# Patient Record
Sex: MALE | Race: WHITE | Employment: OTHER | URBAN - METROPOLITAN AREA
[De-identification: names, ages, dates, MRNs, and addresses within clinical notes are randomized per-mention and may not be internally consistent; named-entity substitution may affect disease eponyms.]

---

## 2017-07-11 ENCOUNTER — HOSPITAL ENCOUNTER (OUTPATIENT)
Dept: SURGERY | Age: 72
Discharge: HOME | End: 2017-07-11
Attending: INTERNAL MEDICINE | Admitting: INTERNAL MEDICINE

## 2017-07-11 VITALS
DIASTOLIC BLOOD PRESSURE: 72 MMHG | RESPIRATION RATE: 16 BRPM | OXYGEN SATURATION: 97 % | TEMPERATURE: 97.3 F | HEART RATE: 58 BPM | HEIGHT: 71 IN | SYSTOLIC BLOOD PRESSURE: 109 MMHG | WEIGHT: 232 LBS | BODY MASS INDEX: 32.48 KG/M2

## 2017-07-11 RX ORDER — SODIUM CHLORIDE, SODIUM LACTATE, POTASSIUM CHLORIDE, CALCIUM CHLORIDE 600; 310; 30; 20 MG/100ML; MG/100ML; MG/100ML; MG/100ML
INJECTION, SOLUTION INTRAVENOUS CONTINUOUS
Status: DISCONTINUED | OUTPATIENT
Start: 2017-07-11 | End: 2017-07-12 | Stop reason: HOSPADM

## 2017-07-11 RX ADMIN — SODIUM CHLORIDE, SODIUM LACTATE, POTASSIUM CHLORIDE, CALCIUM CHLORIDE: 600; 310; 30; 20 INJECTION, SOLUTION INTRAVENOUS at 08:17

## 2017-07-11 ASSESSMENT — PAIN - FUNCTIONAL ASSESSMENT: PAIN_FUNCTIONAL_ASSESSMENT: 0-10

## 2019-07-24 ENCOUNTER — TELEPHONE (OUTPATIENT)
Dept: CARDIOLOGY CLINIC | Age: 74
End: 2019-07-24

## 2021-07-27 ENCOUNTER — TELEPHONE (OUTPATIENT)
Dept: CARDIOLOGY CLINIC | Age: 76
End: 2021-07-27

## 2021-07-28 ENCOUNTER — TELEPHONE (OUTPATIENT)
Dept: CARDIOLOGY CLINIC | Age: 76
End: 2021-07-28

## 2021-07-28 NOTE — TELEPHONE ENCOUNTER
Cardiologist: Dr. Kimberly Wiggins  Surgeon: Dr. Seth Rooney  Surgery: Fusion Prostate Bx   Anesthesia: MAC  Date: Pending  FAX# [phone]  Ph# [phone]    New pt appt.  8/10/2021  w/Dr Kimberly Wiggins    Last seen 5/2015

## 2021-08-10 ENCOUNTER — OFFICE VISIT (OUTPATIENT)
Dept: CARDIOLOGY CLINIC | Age: 76
End: 2021-08-10
Payer: COMMERCIAL

## 2021-08-10 VITALS
DIASTOLIC BLOOD PRESSURE: 66 MMHG | SYSTOLIC BLOOD PRESSURE: 138 MMHG | WEIGHT: 245.8 LBS | BODY MASS INDEX: 34.41 KG/M2 | HEIGHT: 71 IN | HEART RATE: 72 BPM

## 2021-08-10 DIAGNOSIS — Z01.818 PREOPERATIVE CLEARANCE: Primary | ICD-10-CM

## 2021-08-10 PROCEDURE — 93000 ELECTROCARDIOGRAM COMPLETE: CPT | Performed by: INTERNAL MEDICINE

## 2021-08-10 PROCEDURE — 99204 OFFICE O/P NEW MOD 45 MIN: CPT | Performed by: INTERNAL MEDICINE

## 2021-08-10 RX ORDER — ZINC GLUCONATE 50 MG
50 TABLET ORAL DAILY
COMMUNITY

## 2021-08-10 NOTE — PATIENT INSTRUCTIONS
drinks even those labeled  caffeine free or decaffeinated. ? Please wear loose comfortable clothing and comfortable walking shoes. Please wear a short sleeved shirt. ? Please shower or bathe and do not apply powder or lotion to the skin prior to testing, as the electrodes will adhere better giving us a clearer visual EKG recording. Please hold on to these instructions the  will call you within 1-9 business days when we receive authorization from your insurance. Echocardiogram    WHAT TO EXPECT:   ? This test will take approximately 45 minutes. ? It is an ultrasound of the heart. ? It can look at the valves and chambers inside the heart   ? There is no special instructions for this test.     If you are unable to keep this appointment, please notify us 24 hours prior to test at [phone]. Please hold on to these instructions the  will call you within 1-9 business days when we receive authorization from your insurance. Echocardiogram    WHAT TO EXPECT:   ? This test will take approximately 45 minutes. ? It is an ultrasound of the heart. ? It can look at the valves and chambers inside the heart   ? There is no special instructions for this test.     If you are unable to keep this appointment, please notify us 24 hours prior to test at [phone].

## 2021-08-10 NOTE — LETTER
Tolu Tracy Page  1945  [id number]    Have you had any Chest Pain that is not new? - Yes, rare, occasional  If Yes DO EKG - How does it feel - Tightness   How long does the pain last - 2 seconds    How long have you been having the pain - Years   Did you take a NONE   And did it relieve the pain - it goes away on it's own      Have you had any Shortness of Breath - No  If Yes - When     Have you had any dizziness - No     Have you had any palpitations that are not new? - No       Do you have any edema - swelling in No    If Yes - CHECK TO SEE IF THE EDEMA IS PITTING      When did you have your last labs drawn: between 1/2021-4/2021 Not in chart  Where did you have them done Dr. Vick Acharya  What doctor ordered Dr. Vick Acharya    If we do not have these labs you are retrieve these labs for these providers! Do you have a surgery or procedure scheduled in the near future - Yes  If Yes- DO EKG  If Yes - Who is the surgery with?  Dr. Jorge Luis Sorto  Phone number of physician   Fax number of physician   Type of surgery Prostate Biopsy, pending clearance    GIVE THIS INFORMATION TO CHELSEY CH

## 2021-08-10 NOTE — PROGRESS NOTES
CARDIOLOGY NOTE      8/10/2021    RE: Pankaj Villar  ([birth year])                               TO:  MD Timbo Elliottroxy Meade is a 77 y.o. male who was seen today for management of  preop                                    HPI:                   The patient does not have cardiac complaints, here for preop for prostate Bx  Patient also seen  for    - Hypertension,is  well controlled, pt is  compliant with medicines  - Hyperlipidimea, importance of hyperlipidimea discussed with pt. Pankaj Villar has the following history recorded in care path:  Patient Active Problem List    Diagnosis Date Noted    LBBB (left bundle branch block) 04/09/2013    HTN (hypertension) 04/09/2013    Hyperlipidemia     H/O cardiovascular stress test      Current Outpatient Medications   Medication Sig Dispense Refill    zinc gluconate 50 MG tablet Take 50 mg by mouth daily      Cholecalciferol (VITAMIN D) 2000 UNITS CAPS capsule Take 2,000 Units by mouth daily      lisinopril (PRINIVIL;ZESTRIL) 10 MG tablet Take 10 mg by mouth daily.  pravastatin (PRAVACHOL) 40 MG tablet Take 40 mg by mouth daily.  alfuzosin (UROXATRAL) 10 MG SR tablet Take 10 mg by mouth daily.  aspirin 81 MG tablet Take 81 mg by mouth daily. No current facility-administered medications for this visit. Allergies: Other  Past Medical History:   Diagnosis Date    H/O cardiovascular stress test 5/11/2010, 2004    5/11/2010 - No inducible myocardial ischemia. Diaphragmatic attenuation. EF = 48%. LVSF is mildly reduced. No ecg changes. Unremarkable pharmacological stress test.    H/O echocardiogram 5/11/2010 5/11/2010 - LVSF is normal. EF = > 55%.     H/O echocardiogram 04/12/13    EF-55% Normal    Hepatitis     Hyperlipidemia     Hypertension      Past Surgical History:   Procedure Laterality Date    ACHILLES TENDON SURGERY      x 2 s/p ruptures    APPENDECTOMY      BACK SURGERY      COLONOSCOPY      COLONOSCOPY  07/11/2017    2 small polyps    LAMINECTOMY  6/14    OTHER SURGICAL HISTORY      Sub Muscus resection    VASECTOMY        As reviewed   Family History   Problem Relation Age of Onset    Cancer Mother     Heart Attack Father     Diabetes Father     Other Son     Heart Attack Paternal Grandmother     Heart Attack Paternal Grandfather     Atrial Fibrillation Sister      Social History     Tobacco Use    Smoking status: Never Smoker    Smokeless tobacco: Former User   Substance Use Topics    Alcohol use: Yes     Comment: Moderate alcohol use. CAFFEINE: 1 large coffee daily      Review of Systems:    Constitutional: Negative for diaphoresis and fatigue  Psychological:Negative for anxiety or depression  HENT: Negative for headaches, nasal congestion, sinus pain or vertigo  Eyes: Negative for visual disturbance. Endocrine: Negative for polydipsia/polyuria  Respiratory: Negative for shortness of breath  Cardiovascular: Negative for chest pain, dyspnea on exertion, claudication, edema, irregular heartbeat, murmur, palpitations or shortness of breath  Gastrointestinal: Negative for abdominal pain or heartburn  Genito-Urinary: Negative for urinary frequency/urgency  Musculoskeletal: Negative for muscle pain, muscular weakness, negative for pain in arm and leg or swelling in foot and leg  Neurological: Negative for dizziness, headaches, memory loss, numbness/tingling, visual changes, syncope  Dermatological: Negative for rash    Objective:    Vitals:    08/10/21 1133   BP: 138/66   Site: Left Upper Arm   Position: Sitting   Cuff Size: Large Adult   Pulse: 72   Weight: 245 lb 12.8 oz (111.5 kg)   Height: 5' 11\" (1.803 m)     /66 (Site: Left Upper Arm, Position: Sitting, Cuff Size: Large Adult)   Pulse 72   Ht 5' 11\" (1.803 m)   Wt 245 lb 12.8 oz (111.5 kg)   BMI 34.28 kg/m²     No flowsheet data found.      Wt Readings from Last 3 Encounters:   08/10/21 245 lb 12.8 oz (111.5 kg)   07/11/17 232 lb (105.2 kg)   07/10/17 230 lb (104.3 kg)     Body mass index is 34.28 kg/m². GENERAL - Alert, oriented, pleasant, in no apparent distress. EYES: No jaundice, no conjunctival pallor. SKIN: It is warm & dry. No rashes. No Echhymosis    HEENT - No clinically significant abnormalities seen. Neck - Supple. No jugular venous distention noted. No carotid bruits. Cardiovascular - Normal S1 and S2 without obvious murmur or gallop. Extremities - No cyanosis, clubbing, or significant edema. Pulmonary - No respiratory distress. No wheezes or rales. Abdomen - No masses, tenderness, or organomegaly. Musculoskeletal - No significant edema. No joint deformities. No muscle wasting. Neurologic - Cranial nerves II through XII are grossly intact. There were no gross focal neurologic abnormalities. Lab Review   No results found for: CKTOTAL, CKMB, CKMBINDEX, TROPONINT  BNP:  No results found for: BNP  PT/INR:  No results found for: INR  No results found for: LABA1C  No results found for: WBC, HCT, MCV, PLT  No results found for: CHOL, TRIG, HDL, LDLCALC, LDLDIRECT, CHOLHDLRATIO  No results found for: ALT, AST  BMP:  No results found for: NA, K, CL, CO2, BUN, CREATININE  CMP: No results found for: NA, K, CL, CO2, BUN, PROT, ALB  TSH:  No results found for: TSH, TSHHS        Assessment & Plan:    - Preop Assessment: ETT and echo    -  Hypertension: Patients blood pressure is normal. Patient is advised about low sodium diet. Present medical regimen will not be changed. On prinivil         -  LIPID MANAGEMENT:  Importance of lipid levels discussed with patient   and patient was given dietary advice. NCEP- ATP III guidelines reviewed with patient.     -   Changes  in medicines made: No     On pravachol                                   Yudith Crespo MD    [facility] [place]

## 2021-08-13 ENCOUNTER — PROCEDURE VISIT (OUTPATIENT)
Dept: CARDIOLOGY CLINIC | Age: 76
End: 2021-08-13
Payer: COMMERCIAL

## 2021-08-13 DIAGNOSIS — Z01.818 PREOPERATIVE CLEARANCE: ICD-10-CM

## 2021-08-13 LAB
LV EF: 58 %
LVEF MODALITY: NORMAL

## 2021-08-13 PROCEDURE — 93015 CV STRESS TEST SUPVJ I&R: CPT | Performed by: INTERNAL MEDICINE

## 2021-08-13 PROCEDURE — 93306 TTE W/DOPPLER COMPLETE: CPT | Performed by: INTERNAL MEDICINE

## 2021-08-16 ENCOUNTER — TELEPHONE (OUTPATIENT)
Dept: CARDIOLOGY CLINIC | Age: 76
End: 2021-08-16

## 2021-08-16 NOTE — TELEPHONE ENCOUNTER
Summary   Limited study due to patients body habitus. Left ventricular function and size is normal, EF is estimated at 55-60%. Moderate left ventricular hypertrophy. Normal diastolic filling pattern for age. No regional wall motion abnormalities were detected. Bi atrial enlargement noted. Mild mitral and tricuspid regurgitation is present. RVSP is 24 mmHg. No evidence of pericardial effusion. Signature      ------------------------------------------------------------------   Electronically signed by Jorge WhitlockInterpreting physician) on 08/13/2021 at 12:56 PM   ------------------------------------------------------------------      Left msg on pt's v/m advising of results.

## 2021-08-17 ENCOUNTER — TELEPHONE (OUTPATIENT)
Dept: CARDIOLOGY CLINIC | Age: 76
End: 2021-08-17

## 2021-08-17 NOTE — TELEPHONE ENCOUNTER
Pt cld, said he got a bill for DOS 8/10/2021- it was an error as ins hasn't paid yet. I told him to disregard the bill.

## 2021-11-29 ENCOUNTER — OFFICE VISIT (OUTPATIENT)
Dept: CARDIOLOGY CLINIC | Age: 76
End: 2021-11-29
Payer: COMMERCIAL

## 2021-11-29 VITALS
HEIGHT: 71 IN | DIASTOLIC BLOOD PRESSURE: 74 MMHG | SYSTOLIC BLOOD PRESSURE: 128 MMHG | BODY MASS INDEX: 34.3 KG/M2 | HEART RATE: 78 BPM | WEIGHT: 245 LBS

## 2021-11-29 DIAGNOSIS — I10 PRIMARY HYPERTENSION: ICD-10-CM

## 2021-11-29 DIAGNOSIS — I51.7 MODERATE LEFT VENTRICULAR HYPERTROPHY: ICD-10-CM

## 2021-11-29 DIAGNOSIS — E78.2 MIXED HYPERLIPIDEMIA: Primary | ICD-10-CM

## 2021-11-29 DIAGNOSIS — I44.7 LBBB (LEFT BUNDLE BRANCH BLOCK): ICD-10-CM

## 2021-11-29 PROCEDURE — 99214 OFFICE O/P EST MOD 30 MIN: CPT | Performed by: NURSE PRACTITIONER

## 2021-11-29 ASSESSMENT — ENCOUNTER SYMPTOMS
SLEEP DISTURBANCES DUE TO BREATHING: 0
ORTHOPNEA: 0
BACK PAIN: 1
SHORTNESS OF BREATH: 0

## 2021-11-29 NOTE — PROGRESS NOTES
[facility] [address] [facility] [facility]  Phone: [phone]    Fax [phone]                  Krista Mason MD, Queenie Henriquez MD, [address], MD, MD Lida Gregg MD Lenell Mango, MD Sherita Heal, APRN               Nik North Jackson, APRN    Jeff West, APRN              Rafita Grad, APRN            11/29/2021    RE: Shalom Whalen  ([birth year])                               TO:  Dr. Edil Jarvis MD  The primary cardiologist is Dr. Lani Sun    CC:   1. Mixed hyperlipidemia    2. Primary hypertension    3. LBBB (left bundle branch block)         HPI:    Thank you for involving me in taking care of your patient Shalom Whalen. Shalom Whalen is a 77y.o. year old male with a history as listed above and is being seen in the office today. Shalom Whalen reports that he is feeling good. There is no chest pain. He denies SOB. There are no reported palpitations. He denies dizziness. He is  active. SP back fusion and cage. He states that he is now able to go back to doing exercise, not as fast or vigorous but improving       Current Outpatient Medications   Medication Sig Dispense Refill    zinc gluconate 50 MG tablet Take 50 mg by mouth daily      Cholecalciferol (VITAMIN D) 2000 UNITS CAPS capsule Take 2,000 Units by mouth daily      lisinopril (PRINIVIL;ZESTRIL) 10 MG tablet Take 10 mg by mouth daily.  pravastatin (PRAVACHOL) 40 MG tablet Take 40 mg by mouth daily.  alfuzosin (UROXATRAL) 10 MG SR tablet Take 10 mg by mouth daily.  aspirin 81 MG tablet Take 81 mg by mouth daily. No current facility-administered medications for this visit. Allergies: Other  Past Medical History:   Diagnosis Date    Echocardiogram 08/13/2021    EF 55-60%, Mod LVH, Bi Atrial enlargement noted, Mild MR & TR.    H/O cardiovascular stress test 5/11/2010, 2004    5/11/2010 - No inducible myocardial ischemia. Diaphragmatic attenuation. EF = 48%. LVSF is mildly reduced. No ecg changes. Unremarkable pharmacological stress test.    H/O echocardiogram 05/11/2010 5/11/2010 - LVSF is normal. EF = > 55%.  H/O echocardiogram 04/12/2013    EF-55% Normal    Hepatitis     Hyperlipidemia     Hypertension      Past Surgical History:   Procedure Laterality Date    ACHILLES TENDON SURGERY      x 2 s/p ruptures    APPENDECTOMY      BACK SURGERY      COLONOSCOPY      COLONOSCOPY  07/11/2017    2 small polyps    LAMINECTOMY  6/14    OTHER SURGICAL HISTORY      Sub Muscus resection    VASECTOMY       Family History   Problem Relation Age of Onset    Cancer Mother     Heart Attack Father     Diabetes Father     Other Son     Heart Attack Paternal Grandmother     Heart Attack Paternal Grandfather     Atrial Fibrillation Sister      Social History     Tobacco Use    Smoking status: Never Smoker    Smokeless tobacco: Former User   Substance Use Topics    Alcohol use: Yes     Comment: Moderate alcohol use. CAFFEINE: 1 large coffee daily        Review of Systems   Constitutional: Negative for malaise/fatigue. Eyes: Negative for visual disturbance. Cardiovascular: Negative for chest pain, claudication, cyanosis, dyspnea on exertion, irregular heartbeat, leg swelling, near-syncope, orthopnea, palpitations, paroxysmal nocturnal dyspnea and syncope. Respiratory: Negative for shortness of breath and sleep disturbances due to breathing. Musculoskeletal: Positive for back pain (resolving). Neurological: Negative for focal weakness, light-headedness and weakness. Psychiatric/Behavioral: Negative for depression. The patient is not nervous/anxious.              Objective:      Physical Exam:  /74   Pulse 78   Ht 5' 11\" (1.803 m)   Wt 245 lb (111.1 kg)   BMI 34.17 kg/m²   Wt Readings from Last 3 Encounters:   11/29/21 245 lb (111.1 kg)   08/10/21 245 lb 12.8 oz (111.5 kg)   07/11/17 232 lb (105.2 kg) Body mass index is 34.17 kg/m². Physical Exam  Vitals reviewed. Constitutional:       General: He is not in acute distress. Appearance: Normal appearance. He is obese. He is not ill-appearing. HENT:      Head: Atraumatic. Neck:      Vascular: No carotid bruit. Cardiovascular:      Rate and Rhythm: Normal rate and regular rhythm. Pulses: Normal pulses. Heart sounds: Normal heart sounds. No murmur heard. Pulmonary:      Effort: Pulmonary effort is normal. No respiratory distress. Breath sounds: Normal breath sounds. Musculoskeletal:         General: No swelling or deformity. Cervical back: Neck supple. No muscular tenderness. Neurological:      Mental Status: He is alert. DATA  BNP: No results found for: BNP, PROBNP  CBC:   No results found for: WBC, RBC, HGB, HCT, PLT  CMP:  No results found for: NA, K, CL, CO2, BUN, CREATININE, GFRAA, AGRATIO, LABGLOM, GLUCOSE, CALCIUM  Hepatic Function Panel:  No results found for: ALKPHOS, ALT, AST, PROT, BILITOT, BILIDIR, IBILI, LABALBU  Magnesium:  No results found for: MG  PT/INR:  No results found for: PROTIME, INR  Lipids:  No results found for: TRIG, HDL, LDLCALC, LDLDIRECT, LABVLDL  No results found for: LABA1C  TSH:  No results found for: TSH    Echo 8/13/2021   Summary   Limited study due to patients body habitus. Left ventricular function and size is normal, EF is estimated at 55-60%. Moderate left ventricular hypertrophy. Normal diastolic filling pattern for age. No regional wall motion abnormalities were detected. Bi atrial enlargement noted. Mild mitral and tricuspid regurgitation is present. RVSP is 24 mmHg. No evidence of pericardial effusion. GXT stress 8/13/2021   Summary   Overall Impression:   Normal stress test   Normal exercise performance without angina and ischemic EKG changes. Functional Capacity:   normal    Assessment/ Plan:    Patient seen, interviewed and examined. Testing was reviewed. 1. Mixed hyperlipidemia   Lipid panel drawn by PCP   He reports that his total cholesterol is 164, triglycerides 80's   Continue pravastatin   Will attempt to get records.  Discussed with the patient the need for exercise, low cholesterol diet, and compliance with medications. 2. Primary hypertension   Controlled   lispinopril     advised low salt diet     3. LBBB (left bundle branch block)  · Stress test does not show abnormality. · Continue to monitor    4. Moderate LVH  · Discussed exercise induced HTN. · Encouraged to increase cardiac exercise        Lifestyle and risk factor modificatons discussed. Various goals are discussed and questions answered. Continue current medications. Appropriate prescriptions are addressed. Questions answered and patient verbalizes understanding.       Office visit in 1 year

## 2021-11-29 NOTE — LETTER
Marques Oneill  1945  [id number]    Have you had any Chest Pain that is not new? - No  If Yes DO EKG - How does it feel -    How long does the pain last -      How long have you been having the pain -    Did you take a    And did it relieve the pain -      DO EKG IF: Patient has a Heart Rate above 100 or below 40     CAD (Coronary Artery Disease) patient should have one on file every 6 months        Have you had any Shortness of Breath - No  If Yes - When     Have you had any dizziness - No  If Yes DO ORTHOSTATIC BP - when do you feel dizzy    How long does it last .        Sitting wait 5 minutes do supine (laying down) wait 5 minutes then do standing - log each in \"vitals\" area in Epic   Be sure to ask what symptoms they are having if they get dizzy while completing ortho stats such as: room spinning, nausea, etc.    Have you had any palpitations that are not new? - No  If Yes DO EKG - Do you feel your heart   How long does it last - . Is the patient on any of the following medications -   If Yes DO EKG - Needs done every 6 months    Do you have any edema - swelling in No    If Yes - CHECK TO SEE IF THE EDEMA IS PITTING  How long have they been having edema -   If Yes - Have they worn compression stockings   If they have worn compression stockings      Vein \"LEG PROBLEM Questionnaire\"  1. Do you have prominent leg veins? 2. Do you have any skin discoloration? No  3. Do you have any healed or active sores? No  4. Do you have swelling of the legs? No  5. Do you have a family history of varicose veins? No  6. Does your profession involve pro-longed        standing or heavy lifting? No  7. Have you been fighting overweight problems? No  8. Do you have restless legs? No  9. Do you have any night time cramps? No  10.  Do you have any of the following in your legs:             When did you have your last labs drawn , states less than a year ago, goes in [place] for new labs. Where did you have them done   What doctor ordered     If we do not have these labs you are retrieve these labs for these providers! Do you have a surgery or procedure scheduled in the near future - No  If Yes- DO EKG  If Yes - Who is the surgery with?    Phone number of physician   Fax number of physician   Type of surgery   GIVE THIS INFORMATION TO CHELSEY CH     Ask patient if they want to sign up for [employer] if they are not already signed up     Check to see if we have an E-MAIL on file for the patient     Check medication list thoroughly!!! AND RECONCILE OUTSIDE MEDICATIONS  If dose has changed change the entire order not just the [place] At check out add to every patient's \"wrap up\" the following dot phrase AFTERHOURSEDUCATION and ensure we explain this to our patients

## 2022-12-01 ENCOUNTER — OFFICE VISIT (OUTPATIENT)
Dept: CARDIOLOGY CLINIC | Age: 77
End: 2022-12-01
Payer: COMMERCIAL

## 2022-12-01 VITALS
WEIGHT: 240.4 LBS | SYSTOLIC BLOOD PRESSURE: 124 MMHG | HEIGHT: 71 IN | HEART RATE: 59 BPM | DIASTOLIC BLOOD PRESSURE: 78 MMHG | BODY MASS INDEX: 33.65 KG/M2

## 2022-12-01 DIAGNOSIS — E78.2 MIXED HYPERLIPIDEMIA: ICD-10-CM

## 2022-12-01 DIAGNOSIS — I44.7 LBBB (LEFT BUNDLE BRANCH BLOCK): ICD-10-CM

## 2022-12-01 DIAGNOSIS — I10 PRIMARY HYPERTENSION: Primary | ICD-10-CM

## 2022-12-01 PROCEDURE — 3078F DIAST BP <80 MM HG: CPT | Performed by: NURSE PRACTITIONER

## 2022-12-01 PROCEDURE — 99214 OFFICE O/P EST MOD 30 MIN: CPT | Performed by: NURSE PRACTITIONER

## 2022-12-01 PROCEDURE — 1123F ACP DISCUSS/DSCN MKR DOCD: CPT | Performed by: NURSE PRACTITIONER

## 2022-12-01 PROCEDURE — 3074F SYST BP LT 130 MM HG: CPT | Performed by: NURSE PRACTITIONER

## 2022-12-01 PROCEDURE — 93000 ELECTROCARDIOGRAM COMPLETE: CPT | Performed by: NURSE PRACTITIONER

## 2022-12-01 RX ORDER — MULTIVIT WITH MINERALS/LUTEIN
500 TABLET ORAL DAILY
COMMUNITY

## 2022-12-01 ASSESSMENT — ENCOUNTER SYMPTOMS
SHORTNESS OF BREATH: 0
ORTHOPNEA: 0

## 2022-12-01 NOTE — PROGRESS NOTES
12/1/2022  Primary cardiologist: Dr. Sharlett Holter:   Natividad Robert  is an established 69 y.o.  male here for a 12 month follow up on hyperlipidemia and hypertension       SUBJECTIVE/OBJECTIVE:  Natividad Robert is a 69 y.o. male with a history of hypertension, hyperlipidemia, and Left bundle branch block      HPI :   Natividad Robert reports he is feeling well. He denies chest pain or shortness of breath. He is active and compliant with medications. He is monitoring diet and working on weight loss. Review of Systems   Constitutional: Negative for diaphoresis and malaise/fatigue. Cardiovascular:  Negative for chest pain, claudication, dyspnea on exertion, irregular heartbeat, leg swelling, near-syncope, orthopnea, palpitations and paroxysmal nocturnal dyspnea. Respiratory:  Negative for shortness of breath. Neurological:  Negative for dizziness and light-headedness. Vitals:    12/01/22 0819   BP: 124/78   Site: Right Upper Arm   Position: Sitting   Cuff Size: Large Adult   Pulse: 59   Weight: 240 lb 6.4 oz (109 kg)   Height: 5' 11\" (1.803 m)     No flowsheet data found. Wt Readings from Last 3 Encounters:   12/01/22 240 lb 6.4 oz (109 kg)   11/29/21 245 lb (111.1 kg)   08/10/21 245 lb 12.8 oz (111.5 kg)     Body mass index is 33.53 kg/m². Physical Exam  HENT:      Head: Normocephalic and atraumatic. Mouth/Throat:      Mouth: Mucous membranes are moist.   Eyes:      Extraocular Movements: Extraocular movements intact. Pupils: Pupils are equal, round, and reactive to light. Neck:      Vascular: No carotid bruit. Cardiovascular:      Rate and Rhythm: Normal rate and regular rhythm. Pulses: Normal pulses. Heart sounds: Normal heart sounds. Pulmonary:      Effort: Pulmonary effort is normal.      Breath sounds: Normal breath sounds. Abdominal:      General: There is no distension. Palpations: Abdomen is soft. Tenderness: There is no abdominal tenderness.    Musculoskeletal:         General: Normal range of motion. Cervical back: No tenderness. Right lower leg: No edema. Left lower leg: No edema. Skin:     General: Skin is warm and dry. Capillary Refill: Capillary refill takes less than 2 seconds. Neurological:      Mental Status: He is alert and oriented to person, place, and time. Psychiatric:         Mood and Affect: Mood normal.         Behavior: Behavior normal.              Current Outpatient Medications   Medication Sig Dispense Refill    Ascorbic Acid (VITAMIN C) 250 MG tablet Take 500 mg by mouth daily      zinc gluconate 50 MG tablet Take 50 mg by mouth daily      Cholecalciferol (VITAMIN D) 2000 UNITS CAPS capsule Take 2,000 Units by mouth daily      lisinopril (PRINIVIL;ZESTRIL) 10 MG tablet Take 10 mg by mouth daily. pravastatin (PRAVACHOL) 40 MG tablet Take 40 mg by mouth daily Pt takes 1/2 pill every other day      alfuzosin (UROXATRAL) 10 MG extended release tablet Take 10 mg by mouth daily. aspirin 81 MG tablet Take 81 mg by mouth daily. No current facility-administered medications for this visit. All pertinent data reviewed and discussed with patient       ASSESSMENT/PLAN:      Dyslipidemia   No recent lipids- has had labs with PCP  Request copy  He is on Pravachol. No reported adverse events or reported side effects from medication(s) and is stable on current dose. encouraged healthy eating habits including low fat -low /cholesterol diet      Hypertension   Blood pressure is Controlled  He is on lisinopril. No reported adverse events or reported side effects from medication(s). He is stable on current dose.   Encouraged a low sodium diet    Obesity   Bmi 33.53  Working on weight loss     EKG : SR RBBB  No change when compared to previous   Copy to patient       Tests ordered:  none  Follow-up  as needed      Signed:  PENNY Lawson CNP, 12/1/2022, 8:37 AM    An electronic signature was used to authenticate this note.    Please note this report has been partially produced using speech recognition software and may contain errors related to that system including errors in grammar, punctuation, and spelling, as well as words and phrases that may be inappropriate. If there are any questions or concerns please feel free to contact the dictating provider for clarification.

## 2025-07-22 ENCOUNTER — TELEPHONE (OUTPATIENT)
Dept: CARDIOLOGY CLINIC | Age: 80
End: 2025-07-22

## 2025-07-22 NOTE — TELEPHONE ENCOUNTER
[facility]         Patient called to establish cardiac care. Patient was scheduled and given instruction to bring a copy of their state ID, a copy of their insurance cards along with any previous cardiac records. Patient was also asked to bring their current medication bottles to their appointment.      Patient or referring doctor office heard about us through:previous patient.     Patient had an EKG in May from PCP

## 2025-07-24 ENCOUNTER — TELEPHONE (OUTPATIENT)
Dept: CARDIOLOGY CLINIC | Age: 80
End: 2025-07-24

## 2025-07-24 ENCOUNTER — OFFICE VISIT (OUTPATIENT)
Dept: CARDIOLOGY CLINIC | Age: 80
End: 2025-07-24
Payer: COMMERCIAL

## 2025-07-24 VITALS
HEIGHT: 71 IN | SYSTOLIC BLOOD PRESSURE: 128 MMHG | HEART RATE: 86 BPM | DIASTOLIC BLOOD PRESSURE: 70 MMHG | BODY MASS INDEX: 33.32 KG/M2 | WEIGHT: 238 LBS

## 2025-07-24 DIAGNOSIS — R94.31 ABNORMAL ELECTROCARDIOGRAPHY: ICD-10-CM

## 2025-07-24 DIAGNOSIS — R42 DIZZINESS: ICD-10-CM

## 2025-07-24 DIAGNOSIS — R55 SYNCOPE, UNSPECIFIED SYNCOPE TYPE: Primary | ICD-10-CM

## 2025-07-24 PROCEDURE — 93000 ELECTROCARDIOGRAM COMPLETE: CPT | Performed by: INTERNAL MEDICINE

## 2025-07-24 PROCEDURE — 99204 OFFICE O/P NEW MOD 45 MIN: CPT | Performed by: INTERNAL MEDICINE

## 2025-07-24 PROCEDURE — 3078F DIAST BP <80 MM HG: CPT | Performed by: INTERNAL MEDICINE

## 2025-07-24 PROCEDURE — 1123F ACP DISCUSS/DSCN MKR DOCD: CPT | Performed by: INTERNAL MEDICINE

## 2025-07-24 PROCEDURE — 3074F SYST BP LT 130 MM HG: CPT | Performed by: INTERNAL MEDICINE

## 2025-07-24 NOTE — PROGRESS NOTES
CARDIOLOGY NOTE      7/24/2025    RE: Martín Soler  (1945)                               TO:  Yogesh Palomino MD            CHIEF COMPLAINT   Martín is a 80 y.o. male who was seen today for management of                  Patient was seen by me couple of years ago for preop assessment has history of hypertension hyperlipidemia however lately has been feeling dizzy and has been episodes of passing out he is not orthostatic here and does not feel any palpitation does not feel the symptoms when he is exerting.       Martín Soler has the following history recorded in care path:  Patient Active Problem List    Diagnosis Date Noted    Moderate left ventricular hypertrophy 11/29/2021    LBBB (left bundle branch block) 04/09/2013    HTN (hypertension) 04/09/2013    Hyperlipidemia     H/O cardiovascular stress test      Current Outpatient Medications   Medication Sig Dispense Refill    Cholecalciferol (VITAMIN D) 2000 UNITS CAPS capsule Take 1 capsule by mouth daily      pravastatin (PRAVACHOL) 40 MG tablet Take 40 mg by mouth daily Pt takes 1/2 pill every other day      alfuzosin (UROXATRAL) 10 MG extended release tablet Take 1 tablet by mouth daily      aspirin 81 MG tablet Take 1 tablet by mouth daily      Ascorbic Acid (VITAMIN C) 250 MG tablet Take 500 mg by mouth daily (Patient not taking: Reported on 7/24/2025)      zinc gluconate 50 MG tablet Take 50 mg by mouth daily (Patient not taking: Reported on 7/24/2025)      lisinopril (PRINIVIL;ZESTRIL) 10 MG tablet Take 10 mg by mouth daily.       No current facility-administered medications for this visit.     Allergies: Other  Past Medical History:   Diagnosis Date    Echocardiogram 08/13/2021    EF 55-60%, Mod LVH, Bi Atrial enlargement noted, Mild MR & TR.    H/O cardiovascular stress test 5/11/2010, 2004 5/11/2010 - No inducible myocardial ischemia. Diaphragmatic attenuation. EF = 48%. LVSF is mildly reduced. No ecg changes. Unremarkable

## 2025-07-24 NOTE — PATIENT INSTRUCTIONS
Thank you for allowing us to care for you today!   We want to ensure we can follow your treatment plan and we strive to give you the best outcomes and experience possible.   If you ever have a life threatening emergency and call 911 - for an ambulance (EMS)  REMEMBER  Our providers can only care for you at:   [facility] or [facility]   Even if you have someone take you or you drive yourself we can only care for you in a [facility] facility. Our providers are not setup at the other healthcare locations!    PLEASE CALL OUR OFFICE DURING NORMAL BUSINESS HOURS  Monday through Friday 8 am to 5 pm  AFTER HOURS the physician on-call cannot help with scheduling, rescheduling, procedure instruction questions or any type of medication need or issue.  [facility] P:[phone] - [facility] P:[phone] - [facility] P:[phone]      If you receive a survey:  We would appreciate you taking the time to share your experience concerning your provider visit in the office.    These surveys are confidential!  We are eager to improve and are counting on you to share your feedback so we can ensure you get the best care possible.

## 2025-07-24 NOTE — PROGRESS NOTES
CLINICAL STAFF DOCUMENTATION    Dr. Chay Soler  1945  [id number]    Have you had any Chest Pain recently? - No          Have you had any Shortness of Breath - No      Have you had any dizziness - Yes  If Yes DO ORTHOSTATIC BP including pulse  When do you feel dizzy? Randomly   Does the room spin? Foggy   How long does it last .  seconds     Have patient lie down for 5 minutes then measure blood pressure and pulse rate.   Have the patient stand.   Repeat blood pressure and pulse rate after patient has been standing for 1 minute   Repeat blood pressure and pulse rate after patient has been standing for 3 minutes.   Be sure to ask what symptoms they are having if they get dizzy while completing ortho stats such as: room spinning, nausea, etc.    Have you had any palpitations recently? - No      Do you have any edema - swelling in No        Is the patient on any of the following medications -   If Yes DO EKG - Needs done every 3 months      If we do not have these labs, you are retrieve these labs for the provider!    Do you need any prescriptions refilled? -     Do you have a surgery or procedure scheduled in the near future - No    Do use tobacco products? - No  Do you drink alcohol? - Yes  Do you use any illicit drugs? - No  Caffeine? - Yes  How much caffeine? .2  cups       Check medication list thoroughly!!! AND RECONCILE OUTSIDE MEDICATIONS  If dose has changed change the entire order not just the MG  BE SURE TO ASK PATIENT IF THEY NEED MEDICATION REFILLS  Verify Pharmacy and update if incorrect    Add to every patient's \"wrap up\" the following dot phrase AFTERVISITCARDIOHEARTHOUSE and ensure we explain this to our patients

## 2025-07-26 ENCOUNTER — CLINICAL DOCUMENTATION (OUTPATIENT)
Dept: CARDIOLOGY CLINIC | Age: 80
End: 2025-07-26

## 2025-07-27 ENCOUNTER — HOSPITAL ENCOUNTER (INPATIENT)
Age: 80
LOS: 2 days | Discharge: HOME | DRG: 243 | End: 2025-07-29
Attending: EMERGENCY MEDICINE | Admitting: INTERNAL MEDICINE
Payer: MEDICARE

## 2025-07-27 ENCOUNTER — APPOINTMENT (OUTPATIENT)
Dept: GENERAL RADIOLOGY | Age: 80
DRG: 243 | End: 2025-07-27
Payer: MEDICARE

## 2025-07-27 DIAGNOSIS — R55 SYNCOPE AND COLLAPSE: ICD-10-CM

## 2025-07-27 DIAGNOSIS — R55 SYNCOPE: ICD-10-CM

## 2025-07-27 DIAGNOSIS — I45.5 SINUS PAUSE: Primary | ICD-10-CM

## 2025-07-27 DIAGNOSIS — R06.02 SHORTNESS OF BREATH: ICD-10-CM

## 2025-07-27 LAB
ALBUMIN SERPL-MCNC: 4.2 G/DL (ref 3.4–5)
ALBUMIN/GLOB SERPL: 1.5 {RATIO} (ref 1.1–2.2)
ALP SERPL-CCNC: 79 U/L (ref 40–129)
ALT SERPL-CCNC: 15 U/L (ref 10–40)
ANION GAP SERPL CALCULATED.3IONS-SCNC: 10 MMOL/L (ref 9–17)
AST SERPL-CCNC: 20 U/L (ref 15–37)
BASOPHILS # BLD: 0.05 K/UL
BASOPHILS NFR BLD: 1 % (ref 0–1)
BILIRUB SERPL-MCNC: 0.9 MG/DL (ref 0–1)
BUN SERPL-MCNC: 14 MG/DL (ref 7–20)
CALCIUM SERPL-MCNC: 9.4 MG/DL (ref 8.3–10.6)
CHLORIDE SERPL-SCNC: 102 MMOL/L (ref 99–110)
CO2 SERPL-SCNC: 28 MMOL/L (ref 21–32)
CREAT SERPL-MCNC: 1.1 MG/DL (ref 0.8–1.3)
EKG ATRIAL RATE: 71 BPM
EKG DIAGNOSIS: NORMAL
EKG P AXIS: 22 DEGREES
EKG P-R INTERVAL: 204 MS
EKG Q-T INTERVAL: 436 MS
EKG QRS DURATION: 170 MS
EKG QTC CALCULATION (BAZETT): 473 MS
EKG R AXIS: -64 DEGREES
EKG T AXIS: 73 DEGREES
EKG VENTRICULAR RATE: 71 BPM
EOSINOPHIL # BLD: 0.16 K/UL
EOSINOPHILS RELATIVE PERCENT: 2 % (ref 0–3)
ERYTHROCYTE [DISTWIDTH] IN BLOOD BY AUTOMATED COUNT: 13 % (ref 11.7–14.9)
GFR, ESTIMATED: 67 ML/MIN/1.73M2
GLUCOSE SERPL-MCNC: 162 MG/DL (ref 74–99)
HCT VFR BLD AUTO: 45.8 % (ref 42–52)
HGB BLD-MCNC: 16 G/DL (ref 13.5–18)
IMM GRANULOCYTES # BLD AUTO: 0.02 K/UL
IMM GRANULOCYTES NFR BLD: 0 %
LYMPHOCYTES NFR BLD: 1.56 K/UL
LYMPHOCYTES RELATIVE PERCENT: 21 % (ref 24–44)
MAGNESIUM SERPL-MCNC: 2.1 MG/DL (ref 1.8–2.4)
MCH RBC QN AUTO: 33.1 PG (ref 27–31)
MCHC RBC AUTO-ENTMCNC: 34.9 G/DL (ref 32–36)
MCV RBC AUTO: 94.6 FL (ref 78–100)
MONOCYTES NFR BLD: 0.46 K/UL
MONOCYTES NFR BLD: 6 % (ref 0–5)
NEUTROPHILS NFR BLD: 70 % (ref 36–66)
NEUTS SEG NFR BLD: 5.16 K/UL
PLATELET # BLD AUTO: 152 K/UL (ref 140–440)
PMV BLD AUTO: 10.5 FL (ref 7.5–11.1)
POTASSIUM SERPL-SCNC: 4.6 MMOL/L (ref 3.5–5.1)
PROT SERPL-MCNC: 7.1 G/DL (ref 6.4–8.2)
RBC # BLD AUTO: 4.84 M/UL (ref 4.6–6.2)
SODIUM SERPL-SCNC: 140 MMOL/L (ref 136–145)
TROPONIN I SERPL HS-MCNC: 13 NG/L (ref 0–22)
TROPONIN I SERPL HS-MCNC: 14 NG/L (ref 0–22)
WBC OTHER # BLD: 7.4 K/UL (ref 4–10.5)

## 2025-07-27 PROCEDURE — 99285 EMERGENCY DEPT VISIT HI MDM: CPT

## 2025-07-27 PROCEDURE — 94761 N-INVAS EAR/PLS OXIMETRY MLT: CPT

## 2025-07-27 PROCEDURE — 85025 COMPLETE CBC W/AUTO DIFF WBC: CPT

## 2025-07-27 PROCEDURE — 71045 X-RAY EXAM CHEST 1 VIEW: CPT

## 2025-07-27 PROCEDURE — 93005 ELECTROCARDIOGRAM TRACING: CPT | Performed by: EMERGENCY MEDICINE

## 2025-07-27 PROCEDURE — 84484 ASSAY OF TROPONIN QUANT: CPT

## 2025-07-27 PROCEDURE — 93010 ELECTROCARDIOGRAM REPORT: CPT | Performed by: INTERNAL MEDICINE

## 2025-07-27 PROCEDURE — 99223 1ST HOSP IP/OBS HIGH 75: CPT | Performed by: INTERNAL MEDICINE

## 2025-07-27 PROCEDURE — 6370000000 HC RX 637 (ALT 250 FOR IP): Performed by: EMERGENCY MEDICINE

## 2025-07-27 PROCEDURE — 83735 ASSAY OF MAGNESIUM: CPT

## 2025-07-27 PROCEDURE — 80053 COMPREHEN METABOLIC PANEL: CPT

## 2025-07-27 PROCEDURE — 2060000000 HC ICU INTERMEDIATE R&B

## 2025-07-27 PROCEDURE — 2500000003 HC RX 250 WO HCPCS: Performed by: INTERNAL MEDICINE

## 2025-07-27 RX ORDER — ACETAMINOPHEN 650 MG/1
650 SUPPOSITORY RECTAL EVERY 6 HOURS PRN
Status: DISCONTINUED | OUTPATIENT
Start: 2025-07-27 | End: 2025-07-29 | Stop reason: HOSPADM

## 2025-07-27 RX ORDER — POTASSIUM CHLORIDE 7.45 MG/ML
10 INJECTION INTRAVENOUS PRN
Status: DISCONTINUED | OUTPATIENT
Start: 2025-07-27 | End: 2025-07-29 | Stop reason: HOSPADM

## 2025-07-27 RX ORDER — SODIUM CHLORIDE 9 MG/ML
INJECTION, SOLUTION INTRAVENOUS PRN
Status: DISCONTINUED | OUTPATIENT
Start: 2025-07-27 | End: 2025-07-28

## 2025-07-27 RX ORDER — ACETAMINOPHEN 325 MG/1
650 TABLET ORAL EVERY 6 HOURS PRN
Status: DISCONTINUED | OUTPATIENT
Start: 2025-07-27 | End: 2025-07-29 | Stop reason: HOSPADM

## 2025-07-27 RX ORDER — ASPIRIN 81 MG/1
81 TABLET, CHEWABLE ORAL DAILY
Status: DISCONTINUED | OUTPATIENT
Start: 2025-07-28 | End: 2025-07-29 | Stop reason: HOSPADM

## 2025-07-27 RX ORDER — VITAMIN B COMPLEX
2000 TABLET ORAL DAILY
Status: DISCONTINUED | OUTPATIENT
Start: 2025-07-28 | End: 2025-07-29 | Stop reason: HOSPADM

## 2025-07-27 RX ORDER — SODIUM CHLORIDE 0.9 % (FLUSH) 0.9 %
5-40 SYRINGE (ML) INJECTION EVERY 12 HOURS SCHEDULED
Status: DISCONTINUED | OUTPATIENT
Start: 2025-07-27 | End: 2025-07-29 | Stop reason: HOSPADM

## 2025-07-27 RX ORDER — ONDANSETRON 4 MG/1
4 TABLET, ORALLY DISINTEGRATING ORAL EVERY 8 HOURS PRN
Status: DISCONTINUED | OUTPATIENT
Start: 2025-07-27 | End: 2025-07-29 | Stop reason: HOSPADM

## 2025-07-27 RX ORDER — MAGNESIUM SULFATE IN WATER 40 MG/ML
2000 INJECTION, SOLUTION INTRAVENOUS PRN
Status: DISCONTINUED | OUTPATIENT
Start: 2025-07-27 | End: 2025-07-29 | Stop reason: HOSPADM

## 2025-07-27 RX ORDER — LISINOPRIL 5 MG/1
10 TABLET ORAL DAILY
Status: DISCONTINUED | OUTPATIENT
Start: 2025-07-28 | End: 2025-07-29 | Stop reason: HOSPADM

## 2025-07-27 RX ORDER — SODIUM CHLORIDE 0.9 % (FLUSH) 0.9 %
5-40 SYRINGE (ML) INJECTION PRN
Status: DISCONTINUED | OUTPATIENT
Start: 2025-07-27 | End: 2025-07-29 | Stop reason: HOSPADM

## 2025-07-27 RX ORDER — ASPIRIN 81 MG/1
324 TABLET, CHEWABLE ORAL ONCE
Status: COMPLETED | OUTPATIENT
Start: 2025-07-27 | End: 2025-07-27

## 2025-07-27 RX ORDER — POLYETHYLENE GLYCOL 3350 17 G/17G
17 POWDER, FOR SOLUTION ORAL DAILY PRN
Status: DISCONTINUED | OUTPATIENT
Start: 2025-07-27 | End: 2025-07-29 | Stop reason: HOSPADM

## 2025-07-27 RX ORDER — ENOXAPARIN SODIUM 100 MG/ML
40 INJECTION SUBCUTANEOUS DAILY
Status: DISCONTINUED | OUTPATIENT
Start: 2025-07-28 | End: 2025-07-27 | Stop reason: DRUGHIGH

## 2025-07-27 RX ORDER — ONDANSETRON 2 MG/ML
4 INJECTION INTRAMUSCULAR; INTRAVENOUS EVERY 6 HOURS PRN
Status: DISCONTINUED | OUTPATIENT
Start: 2025-07-27 | End: 2025-07-29 | Stop reason: HOSPADM

## 2025-07-27 RX ORDER — ENOXAPARIN SODIUM 100 MG/ML
30 INJECTION SUBCUTANEOUS 2 TIMES DAILY
Status: DISCONTINUED | OUTPATIENT
Start: 2025-07-28 | End: 2025-07-29 | Stop reason: HOSPADM

## 2025-07-27 RX ORDER — POTASSIUM CHLORIDE 1500 MG/1
40 TABLET, EXTENDED RELEASE ORAL PRN
Status: DISCONTINUED | OUTPATIENT
Start: 2025-07-27 | End: 2025-07-29 | Stop reason: HOSPADM

## 2025-07-27 RX ADMIN — ASPIRIN 81 MG 324 MG: 81 TABLET ORAL at 11:24

## 2025-07-27 RX ADMIN — SODIUM CHLORIDE, PRESERVATIVE FREE 10 ML: 5 INJECTION INTRAVENOUS at 21:44

## 2025-07-27 ASSESSMENT — LIFESTYLE VARIABLES
HOW MANY STANDARD DRINKS CONTAINING ALCOHOL DO YOU HAVE ON A TYPICAL DAY: PATIENT DOES NOT DRINK
HOW OFTEN DO YOU HAVE A DRINK CONTAINING ALCOHOL: NEVER

## 2025-07-27 ASSESSMENT — PAIN SCALES - GENERAL: PAINLEVEL_OUTOF10: 0

## 2025-07-27 ASSESSMENT — PAIN - FUNCTIONAL ASSESSMENT: PAIN_FUNCTIONAL_ASSESSMENT: 0-10

## 2025-07-27 NOTE — ED NOTES
The following labs were labeled with appropriate pt sticker and tubed to lab:     [x] Blue     [x] Lavender   [] on ice  [x] Green/yellow x2  [] Green/black [] on ice  [x] Grey  [] on ice  [] Yellow  [x] Red  [] Pink  [] Type/ Screen  [] ABG  [] VBG    [] COVID-19 swab    [] Rapid  [] PCR  [] Flu swab  [] Peds Viral Panel     [] Urine Sample  [] Fecal Sample  [] Pelvic Cultures  [] Blood Cultures  [] X 2  [] STREP Cultures  [] Wound Cultures

## 2025-07-27 NOTE — CONSULTS
Name:  Martín Soler /Age/Sex: 1945  (80 y.o. male)   MRN & CSN:  [id number] & [id number] Admission Date/Time: 2025 10:31 AM   Location:  ED29/ED-29 PCP: Yogesh Alas MD       Hospital Day: 1          Referring physician:  Ginger Kiran MD         Reason for consultation: Syncope            Thanks for referral.    Information source: Patient    CC; syncope      HPI:   Thank you for involving me in taking  care of Martín Soler who  is a 80 y.o.year  Old male  Presents with history of hypertension, hyperlipidemia now presents with having episodes of syncope  Had an event monitor had a 6-second pause I was called for that last evening  Was advised to come to the emergency room for pacemaker implantation evaluation                   Past medical history:    has a past medical history of Echocardiogram, H/O cardiovascular stress test, H/O echocardiogram, H/O echocardiogram, Hepatitis, Hyperlipidemia, and Hypertension.  Past surgical history:   has a past surgical history that includes Appendectomy; Achilles tendon surgery; other surgical history; laminectomy (); Vasectomy; back surgery; Colonoscopy; and Colonoscopy (2017).  Social History:   reports that he has never smoked. He has quit using smokeless tobacco. He reports current alcohol use. He reports that he does not use drugs.  Family history:  family history includes Atrial Fibrillation in his sister; Cancer in his mother; Diabetes in his father; Heart Attack in his father, paternal grandfather, and paternal grandmother; Other in his son.    Allergies   Allergen Reactions    Other      Macadamia nuts cause hives.       No current facility-administered medications for this encounter.    No current facility-administered medications for this encounter.     Current Outpatient Medications   Medication Sig Dispense Refill    Ascorbic Acid (VITAMIN C) 250 MG tablet Take 500 mg by mouth daily (Patient not taking: Reported on

## 2025-07-27 NOTE — H&P
V2.0  History and Physical      Name:  Martín Soler /Age/Sex: 1945  (80 y.o. male)   MRN & CSN:  [id number] & [id number] Encounter Date/Time: 2025 1:02 PM EDT   Location:  ED29/ED-29 PCP: Yogesh Alas MD       Hospital Day: 1    Assessment and Plan:   Martín Soler is a 80 y.o. male with a pmh of HTN, Vitamin D deficiency, who presents with Sinus pause    Hospital Problems           Last Modified POA    * (Principal) Sinus pause 2025 Yes       Sinus pause  Hx of multiple syncopal episode over the past year  -presenting complaint- sinus pause 6 second; sent from cardiology clinic for pacemaker placement  -admit on telemetry  -NPO from midnight  -plan for PPM tomorrow    Essential HTN, continue lisinopril  Vitamin D deficiency, continue supplement    Disposition:     Diet No diet orders on file   DVT Prophylaxis [] Lovenox, []  Heparin, [] SCDs, [] Ambulation,  [] Eliquis, [] Xarelto  [] Coumadin   Peptic ulcer prophylaxis -   Code Status No Order   Disposition From / Current living situation : Home  Expected Disposition: Home  Estimated Date of Discharge: 2 to 3 days   Patient requires continued admission due to sinus pauses requiring PPM   Surrogate Decision Maker/ POA -     Goals status was discussed in detail with patient.  Verbalized understanding of different options of CODE STATUS.  Patient opted for full code.    History from:     patient    History of Present Illness:     Chief Complaint: Sinus pause  Martín Soler is a 80 y.o. male with pmh of hypertension, vitamin D deficiency, syncopal episodes, who was seen by Dr. Marsh's team in the clinic-was found to have sinus pause lasting 6 seconds. Cardiac monitor was placed due to history of multiple syncopal episode over the past year.  Patient was sent in to be admitted for permanent pacemaker placement tomorrow.  Patient will be admitted to stepdown on telemetry.  N.p.o. after midnight.  Cardiology team is on board.        Personally

## 2025-07-27 NOTE — ED NOTES
ED TO INPATIENT SBAR HANDOFF    Patient Name: Martín Soler   :  1945  80 y.o.   Preferred Name  Martín  Family/Caregiver Present yes   Restraints no   C-SSRS: Risk of Suicide: No Risk  Sitter no   Sepsis Risk Score Sepsis V2 Risk Score: 14.5    PLEASE NOTE--Encounter / Re-Admission Within 30 Days  This patient has had another encounter or admission within the last 30 days.      No data recorded    Situation  Chief Complaint   Patient presents with    reports Maximo sent for admission for pacemaker placement     Irregular Heart Beat     Brief Description of Patient's Condition: Irregular Heart Beat  reports Maximo sent for admission for pacemaker placement  Mental Status: oriented, alert, coherent, logical, thought processes intact, and able to concentrate and follow conversation  Arrived from: home    Imaging:   XR CHEST PORTABLE   Final Result        Abnormal labs:   Abnormal Labs Reviewed   COMPREHENSIVE METABOLIC PANEL - Abnormal; Notable for the following components:       Result Value    Glucose 162 (*)     All other components within normal limits   CBC WITH AUTO DIFFERENTIAL - Abnormal; Notable for the following components:    MCH 33.1 (*)     Neutrophils % 70 (*)     Lymphocytes % 21 (*)     Monocytes % 6 (*)     All other components within normal limits        Background  History:   Past Medical History:   Diagnosis Date    Echocardiogram 2021    EF 55-60%, Mod LVH, Bi Atrial enlargement noted, Mild MR & TR.    H/O cardiovascular stress test 2010, 2004    2010 - No inducible myocardial ischemia. Diaphragmatic attenuation. EF = 48%. LVSF is mildly reduced. No ecg changes. Unremarkable pharmacological stress test.    H/O echocardiogram 2010 - LVSF is normal. EF = > 55%.    H/O echocardiogram 2013    EF-55% Normal    Hepatitis     Hyperlipidemia     Hypertension        Assessment    Vitals:    Level of Consciousness: Alert (0)   Vitals:    25 1045 25

## 2025-07-27 NOTE — CONSULTS
LOVENOX PROPHYLAXIS EVALUATION  (Populations not addressed in this protocol: trauma, obstetrics, or COVID-19)    Wt Readings from Last 3 Encounters:   07/24/25 108 kg (238 lb)   12/01/22 109 kg (240 lb 6.4 oz)   11/29/21 111.1 kg (245 lb)       Estimated Creatinine Clearance: 68 mL/min (based on SCr of 1.1 mg/dL).  Recent Labs     07/27/25  1110   BUN 14   CREATININE 1.1      HGB 16.0   HCT 45.8       Weight Range: 101-150.9 kg    CRCL = 30 or greater    []  50.9 kg   and below     []  .9  kg   []  101-150.9 kg   []  151-174.9  kg   []  175 kg  or greater     30mg subq  daily     40mg subq daily    (or 30mg subq BID orthopedic)     30mg subq  BID   40mg subq  BID   60mg subq BID       Per P/T protocol for appropriate subq anticoagulation by weight and CRCL change to:    Enoxaparin 30mg subq BID      Fawn Cohen RPH  1:50 PM  07/27/25

## 2025-07-27 NOTE — ED PROVIDER NOTES
Emergency Department Encounter  Location: [facility] ICU STEPDOWN    Patient: Martín Soler  MRN: [id number]  : 1945  Date of evaluation: 2025  ED Provider: Kellee Sheikh DO    Chief Complaint:    reports Maximo sent for admission for pacemaker placement  and Irregular Heart Beat    [place]:  Martín Soler is a 80 y.o. male that presents to the emergency department with concern for abnormal Holter monitor.  The patient saw Dr. Marsh Thursday after experiencing a syncopal episode on Monday.  He has a history of multiple episodes of syncope over the past year or so.  Unclear trigger but states he can \"feel it coming\" and describes feeling \"cloudy\" before it occurs.  Patient was placed on monitor per cardiology and was found to be having sinus pauses.  Was contacted by Dr. Marsh and instructed to come here last night.  Patient states he decided to wait until this morning.  Also notes he has not been on BP meds for several medications because he was normotensive in the office and meds were suspected to be contributing to syncopal episodes.       Past Medical History:   Diagnosis Date    Echocardiogram 2021    EF 55-60%, Mod LVH, Bi Atrial enlargement noted, Mild MR & TR.    H/O cardiovascular stress test 2010, 2004    2010 - No inducible myocardial ischemia. Diaphragmatic attenuation. EF = 48%. LVSF is mildly reduced. No ecg changes. Unremarkable pharmacological stress test.    H/O echocardiogram 2010 - LVSF is normal. EF = > 55%.    H/O echocardiogram 2013    EF-55% Normal    Hepatitis     Hyperlipidemia     Hypertension      Past Surgical History:   Procedure Laterality Date    ACHILLES TENDON SURGERY      x 2 s/p ruptures    APPENDECTOMY      BACK SURGERY      COLONOSCOPY      COLONOSCOPY  2017    2 small polyps    LAMINECTOMY      OTHER SURGICAL HISTORY      Sub Muscus resection    VASECTOMY       Family History   Problem Relation Age of Onset    Cancer Mother

## 2025-07-28 ENCOUNTER — APPOINTMENT (OUTPATIENT)
Dept: NON INVASIVE DIAGNOSTICS | Age: 80
DRG: 243 | End: 2025-07-28
Attending: INTERNAL MEDICINE
Payer: MEDICARE

## 2025-07-28 ENCOUNTER — TELEPHONE (OUTPATIENT)
Dept: CARDIOLOGY CLINIC | Age: 80
End: 2025-07-28

## 2025-07-28 ENCOUNTER — APPOINTMENT (OUTPATIENT)
Dept: GENERAL RADIOLOGY | Age: 80
DRG: 243 | End: 2025-07-28
Payer: MEDICARE

## 2025-07-28 PROBLEM — I49.5 SICK SINUS SYNDROME (HCC): Status: ACTIVE | Noted: 2025-07-27

## 2025-07-28 LAB
ALBUMIN SERPL-MCNC: 3.8 G/DL (ref 3.4–5)
ALBUMIN/GLOB SERPL: 1.5 {RATIO} (ref 1.1–2.2)
ALP SERPL-CCNC: 74 U/L (ref 40–129)
ALT SERPL-CCNC: 13 U/L (ref 10–40)
ANION GAP SERPL CALCULATED.3IONS-SCNC: 8 MMOL/L (ref 9–17)
AST SERPL-CCNC: 16 U/L (ref 15–37)
BASOPHILS # BLD: 0.04 K/UL
BASOPHILS NFR BLD: 1 % (ref 0–1)
BILIRUB DIRECT SERPL-MCNC: 0.3 MG/DL (ref 0–0.3)
BILIRUB INDIRECT SERPL-MCNC: 0.5 MG/DL (ref 0–0.7)
BILIRUB SERPL-MCNC: 0.8 MG/DL (ref 0–1)
BUN SERPL-MCNC: 21 MG/DL (ref 7–20)
CALCIUM SERPL-MCNC: 8.9 MG/DL (ref 8.3–10.6)
CHLORIDE SERPL-SCNC: 106 MMOL/L (ref 99–110)
CO2 SERPL-SCNC: 26 MMOL/L (ref 21–32)
CREAT SERPL-MCNC: 0.9 MG/DL (ref 0.8–1.3)
ECHO AO ROOT DIAM: 3.6 CM
ECHO AO ROOT INDEX: 1.6 CM/M2
ECHO AV AREA PEAK VELOCITY: 2.5 CM2
ECHO AV AREA VTI: 2.3 CM2
ECHO AV AREA/BSA PEAK VELOCITY: 1.1 CM2/M2
ECHO AV AREA/BSA VTI: 1 CM2/M2
ECHO AV MEAN GRADIENT: 7 MMHG
ECHO AV MEAN VELOCITY: 1.2 M/S
ECHO AV PEAK GRADIENT: 14 MMHG
ECHO AV PEAK VELOCITY: 1.9 M/S
ECHO AV VELOCITY RATIO: 0.79
ECHO AV VTI: 33.6 CM
ECHO BSA: 2.3 M2
ECHO BSA: 2.3 M2
ECHO EST RA PRESSURE: 3 MMHG
ECHO IVC PROX: 1.7 CM
ECHO LV E' LATERAL VELOCITY: 14 CM/S
ECHO LV E' SEPTAL VELOCITY: 7.7 CM/S
ECHO LV EDV A4C: 153 ML
ECHO LV EDV INDEX A4C: 68 ML/M2
ECHO LV EF PHYSICIAN: 60 %
ECHO LV EJECTION FRACTION A4C: 58 %
ECHO LV ESV A4C: 64 ML
ECHO LV ESV INDEX A4C: 28 ML/M2
ECHO LV FRACTIONAL SHORTENING: 25 % (ref 28–44)
ECHO LV INTERNAL DIMENSION DIASTOLE INDEX: 2.53 CM/M2
ECHO LV INTERNAL DIMENSION DIASTOLIC: 5.7 CM (ref 4.2–5.9)
ECHO LV INTERNAL DIMENSION SYSTOLIC INDEX: 1.91 CM/M2
ECHO LV INTERNAL DIMENSION SYSTOLIC: 4.3 CM
ECHO LV IVSD: 0.9 CM (ref 0.6–1)
ECHO LV MASS 2D: 211.7 G (ref 88–224)
ECHO LV MASS INDEX 2D: 94.1 G/M2 (ref 49–115)
ECHO LV POSTERIOR WALL DIASTOLIC: 1 CM (ref 0.6–1)
ECHO LV RELATIVE WALL THICKNESS RATIO: 0.35
ECHO LVOT AREA: 3.1 CM2
ECHO LVOT AV VTI INDEX: 0.72
ECHO LVOT DIAM: 2 CM
ECHO LVOT MEAN GRADIENT: 4 MMHG
ECHO LVOT PEAK GRADIENT: 9 MMHG
ECHO LVOT PEAK VELOCITY: 1.5 M/S
ECHO LVOT STROKE VOLUME INDEX: 33.9 ML/M2
ECHO LVOT SV: 76.3 ML
ECHO LVOT VTI: 24.3 CM
ECHO MV A VELOCITY: 0.94 M/S
ECHO MV E DECELERATION TIME (DT): 243 MS
ECHO MV E VELOCITY: 0.73 M/S
ECHO MV E/A RATIO: 0.78
ECHO MV E/E' LATERAL: 5.21
ECHO MV E/E' RATIO (AVERAGED): 7.35
ECHO MV E/E' SEPTAL: 9.48
ECHO RV FREE WALL PEAK S': 12.6 CM/S
ECHO RV TAPSE: 1.4 CM (ref 1.7–?)
EOSINOPHIL # BLD: 0.2 K/UL
EOSINOPHILS RELATIVE PERCENT: 3 % (ref 0–3)
ERYTHROCYTE [DISTWIDTH] IN BLOOD BY AUTOMATED COUNT: 13.1 % (ref 11.7–14.9)
GFR, ESTIMATED: 79 ML/MIN/1.73M2
GLUCOSE SERPL-MCNC: 144 MG/DL (ref 74–99)
HCT VFR BLD AUTO: 45.1 % (ref 42–52)
HGB BLD-MCNC: 15.2 G/DL (ref 13.5–18)
IMM GRANULOCYTES # BLD AUTO: 0.02 K/UL
IMM GRANULOCYTES NFR BLD: 0 %
LYMPHOCYTES NFR BLD: 1.44 K/UL
LYMPHOCYTES RELATIVE PERCENT: 22 % (ref 24–44)
MAGNESIUM SERPL-MCNC: 2.2 MG/DL (ref 1.8–2.4)
MCH RBC QN AUTO: 32.2 PG (ref 27–31)
MCHC RBC AUTO-ENTMCNC: 33.7 G/DL (ref 32–36)
MCV RBC AUTO: 95.6 FL (ref 78–100)
MONOCYTES NFR BLD: 0.56 K/UL
MONOCYTES NFR BLD: 9 % (ref 0–5)
NEUTROPHILS NFR BLD: 66 % (ref 36–66)
NEUTS SEG NFR BLD: 4.29 K/UL
PHOSPHATE SERPL-MCNC: 2.9 MG/DL (ref 2.5–4.9)
PLATELET, FLUORESCENCE: 143 K/UL (ref 140–440)
PMV BLD AUTO: 10.7 FL (ref 7.5–11.1)
POTASSIUM SERPL-SCNC: 4.1 MMOL/L (ref 3.5–5.1)
PROT SERPL-MCNC: 6.4 G/DL (ref 6.4–8.2)
RBC # BLD AUTO: 4.72 M/UL (ref 4.6–6.2)
SODIUM SERPL-SCNC: 140 MMOL/L (ref 136–145)
WBC OTHER # BLD: 6.6 K/UL (ref 4–10.5)

## 2025-07-28 PROCEDURE — 33208 INSRT HEART PM ATRIAL & VENT: CPT | Performed by: INTERNAL MEDICINE

## 2025-07-28 PROCEDURE — 94761 N-INVAS EAR/PLS OXIMETRY MLT: CPT

## 2025-07-28 PROCEDURE — 6360000002 HC RX W HCPCS

## 2025-07-28 PROCEDURE — 93306 TTE W/DOPPLER COMPLETE: CPT

## 2025-07-28 PROCEDURE — 0JH606Z INSERTION OF PACEMAKER, DUAL CHAMBER INTO CHEST SUBCUTANEOUS TISSUE AND FASCIA, OPEN APPROACH: ICD-10-PCS | Performed by: INTERNAL MEDICINE

## 2025-07-28 PROCEDURE — 84100 ASSAY OF PHOSPHORUS: CPT

## 2025-07-28 PROCEDURE — 99232 SBSQ HOSP IP/OBS MODERATE 35: CPT | Performed by: INTERNAL MEDICINE

## 2025-07-28 PROCEDURE — 2500000003 HC RX 250 WO HCPCS

## 2025-07-28 PROCEDURE — 02H63JZ INSERTION OF PACEMAKER LEAD INTO RIGHT ATRIUM, PERCUTANEOUS APPROACH: ICD-10-PCS | Performed by: INTERNAL MEDICINE

## 2025-07-28 PROCEDURE — 6360000004 HC RX CONTRAST MEDICATION

## 2025-07-28 PROCEDURE — 2500000003 HC RX 250 WO HCPCS: Performed by: INTERNAL MEDICINE

## 2025-07-28 PROCEDURE — 36415 COLL VENOUS BLD VENIPUNCTURE: CPT

## 2025-07-28 PROCEDURE — 6360000004 HC RX CONTRAST MEDICATION: Performed by: INTERNAL MEDICINE

## 2025-07-28 PROCEDURE — 02HK3JZ INSERTION OF PACEMAKER LEAD INTO RIGHT VENTRICLE, PERCUTANEOUS APPROACH: ICD-10-PCS | Performed by: INTERNAL MEDICINE

## 2025-07-28 PROCEDURE — C1785 PMKR, DUAL, RATE-RESP: HCPCS | Performed by: INTERNAL MEDICINE

## 2025-07-28 PROCEDURE — 2709999900 HC NON-CHARGEABLE SUPPLY: Performed by: INTERNAL MEDICINE

## 2025-07-28 PROCEDURE — C1892 INTRO/SHEATH,FIXED,PEEL-AWAY: HCPCS | Performed by: INTERNAL MEDICINE

## 2025-07-28 PROCEDURE — 83735 ASSAY OF MAGNESIUM: CPT

## 2025-07-28 PROCEDURE — 71045 X-RAY EXAM CHEST 1 VIEW: CPT

## 2025-07-28 PROCEDURE — 82248 BILIRUBIN DIRECT: CPT

## 2025-07-28 PROCEDURE — 6360000002 HC RX W HCPCS: Performed by: INTERNAL MEDICINE

## 2025-07-28 PROCEDURE — C1898 LEAD, PMKR, OTHER THAN TRANS: HCPCS | Performed by: INTERNAL MEDICINE

## 2025-07-28 PROCEDURE — 93306 TTE W/DOPPLER COMPLETE: CPT | Performed by: INTERNAL MEDICINE

## 2025-07-28 PROCEDURE — 2060000000 HC ICU INTERMEDIATE R&B

## 2025-07-28 PROCEDURE — 85025 COMPLETE CBC W/AUTO DIFF WBC: CPT

## 2025-07-28 PROCEDURE — 80053 COMPREHEN METABOLIC PANEL: CPT

## 2025-07-28 PROCEDURE — 99223 1ST HOSP IP/OBS HIGH 75: CPT | Performed by: INTERNAL MEDICINE

## 2025-07-28 DEVICE — IPG W1DR01 AZURE XT DR MRI USA
Type: IMPLANTABLE DEVICE | Site: HEART | Status: FUNCTIONAL
Brand: AZURE™ XT DR MRI SURESCAN™

## 2025-07-28 DEVICE — LEAD 5076-58 MRI US RCMCRD
Type: IMPLANTABLE DEVICE | Site: HEART | Status: FUNCTIONAL
Brand: CAPSUREFIX NOVUS MRI™ SURESCAN®

## 2025-07-28 DEVICE — LEAD 5076-52 RCMCRD MRI US
Type: IMPLANTABLE DEVICE | Site: HEART | Status: FUNCTIONAL
Brand: CAPSUREFIX NOVUS MRI™ SURESCAN®

## 2025-07-28 RX ORDER — SODIUM CHLORIDE 0.9 % (FLUSH) 0.9 %
5-40 SYRINGE (ML) INJECTION PRN
Status: DISCONTINUED | OUTPATIENT
Start: 2025-07-28 | End: 2025-07-29 | Stop reason: HOSPADM

## 2025-07-28 RX ORDER — FENTANYL CITRATE 50 UG/ML
INJECTION, SOLUTION INTRAMUSCULAR; INTRAVENOUS PRN
Status: DISCONTINUED | OUTPATIENT
Start: 2025-07-28 | End: 2025-07-28 | Stop reason: HOSPADM

## 2025-07-28 RX ORDER — SODIUM CHLORIDE 9 MG/ML
INJECTION, SOLUTION INTRAVENOUS PRN
Status: DISCONTINUED | OUTPATIENT
Start: 2025-07-28 | End: 2025-07-29 | Stop reason: HOSPADM

## 2025-07-28 RX ORDER — SODIUM CHLORIDE 0.9 % (FLUSH) 0.9 %
5-40 SYRINGE (ML) INJECTION EVERY 12 HOURS SCHEDULED
Status: DISCONTINUED | OUTPATIENT
Start: 2025-07-28 | End: 2025-07-29 | Stop reason: HOSPADM

## 2025-07-28 RX ORDER — IOPAMIDOL 755 MG/ML
INJECTION, SOLUTION INTRAVASCULAR PRN
Status: DISCONTINUED | OUTPATIENT
Start: 2025-07-28 | End: 2025-07-28 | Stop reason: HOSPADM

## 2025-07-28 RX ORDER — TRAMADOL HYDROCHLORIDE 50 MG/1
50 TABLET ORAL EVERY 6 HOURS PRN
Status: DISCONTINUED | OUTPATIENT
Start: 2025-07-28 | End: 2025-07-29 | Stop reason: HOSPADM

## 2025-07-28 RX ORDER — MIDAZOLAM HYDROCHLORIDE 1 MG/ML
INJECTION, SOLUTION INTRAMUSCULAR; INTRAVENOUS PRN
Status: DISCONTINUED | OUTPATIENT
Start: 2025-07-28 | End: 2025-07-28 | Stop reason: HOSPADM

## 2025-07-28 RX ADMIN — WATER 2000 MG: 1 INJECTION INTRAMUSCULAR; INTRAVENOUS; SUBCUTANEOUS at 17:40

## 2025-07-28 RX ADMIN — SODIUM CHLORIDE, PRESERVATIVE FREE 10 ML: 5 INJECTION INTRAVENOUS at 22:40

## 2025-07-28 RX ADMIN — SODIUM CHLORIDE, PRESERVATIVE FREE 10 ML: 5 INJECTION INTRAVENOUS at 22:41

## 2025-07-28 ASSESSMENT — ENCOUNTER SYMPTOMS
BLOOD IN STOOL: 0
SHORTNESS OF BREATH: 0
ABDOMINAL PAIN: 0
TROUBLE SWALLOWING: 0
CHEST TIGHTNESS: 0
NAUSEA: 0

## 2025-07-28 ASSESSMENT — PAIN SCALES - GENERAL
PAINLEVEL_OUTOF10: 0
PAINLEVEL_OUTOF10: 0

## 2025-07-28 NOTE — TELEPHONE ENCOUNTER
Received Critical Event Alert from [employer] over weekend. [place] contacted Dr. Marsh, pt was sent to hospital. Reports have been scanned into chart.

## 2025-07-28 NOTE — CONSULTS
Electrophysiology Consult Note      Reason for consultation:  significant pause, syncope    Chief complaint :  Syncope    Referring physician:       Primary care physician: Yogesh Alas MD      History of Present Illness:       Patient is a 80-year-old male with with a history of hypertension, hyperlipidemia, syncopal episodes presented to the hospital with syncopal episode.  Patient had 6-second pauses on the monitor he was wearing.  He had had several episodes of syncope in the last 1 year.  Patient was called in for an permanent pacemaker placement.  Patient denies chest pain or shortness of breath he reports he passes out and immediately wakes up and he gets no warnings.  Patient denies palpitations.  Patient agreeable with proceeding with pacemaker      Pastmedical history:   Past Medical History:   Diagnosis Date    Echocardiogram 08/13/2021    EF 55-60%, Mod LVH, Bi Atrial enlargement noted, Mild MR & TR.    H/O cardiovascular stress test 5/11/2010, 2004    5/11/2010 - No inducible myocardial ischemia. Diaphragmatic attenuation. EF = 48%. LVSF is mildly reduced. No ecg changes. Unremarkable pharmacological stress test.    H/O echocardiogram 05/11/2010 5/11/2010 - LVSF is normal. EF = > 55%.    H/O echocardiogram 04/12/2013    EF-55% Normal    Hepatitis     Hyperlipidemia     Hypertension        Surgical history :   Past Surgical History:   Procedure Laterality Date    ACHILLES TENDON SURGERY      x 2 s/p ruptures    APPENDECTOMY      BACK SURGERY      COLONOSCOPY      COLONOSCOPY  07/11/2017    2 small polyps    LAMINECTOMY  6/14    OTHER SURGICAL HISTORY      Sub Muscus resection    VASECTOMY         Family history:   Family History   Problem Relation Age of Onset    Cancer Mother     Heart Attack Father     Diabetes Father     Other Son     Heart Attack Paternal Grandmother     Heart Attack Paternal Grandfather     Atrial Fibrillation Sister

## 2025-07-28 NOTE — PROGRESS NOTES
CARDIOLOGY  NOTE        Name:  Martín Soler /Age/Sex: 1945  (80 y.o. male)   MRN & CSN:  [id number] & [id number] Admission Date/Time: 2025 10:31 AM   Location:  -A PCP: Yogesh Alas MD       Hospital Day: 2        PLAN FROM CARDIOLOGY FOR TODAY:   Pacemaker placed today      - cardiology consult is for: 6-second pause    -  Interval history: No more pauses    ASSESSMENT/ PLAN:      Patient was seen in the office for syncope, had a event monitor which showed 6-second pause, now is here per our advice for possible pacemaker implantation  EP consultation for pacer implantation     Hypertension blood pressure stable patient is on Prinivil which will be continued     Hyperlipidemia on Pravachol will check the LDL     Will have an echocardiogram done tomorrow morning for further assessment     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  Chief Complaint   Patient presents with    reports Maximo sent for admission for pacemaker placement     Irregular Heart Beat           Objective: Temperature:  Current - Temp: 97.9 °F (36.6 °C); Max - Temp  Av.3 °F (36.8 °C)  Min: 97.6 °F (36.4 °C)  Max: 98.9 °F (37.2 °C)    Respiratory Rate : Resp  Av.3  Min: 10  Max: 20    Pulse Range: Pulse  Av.9  Min: 56  Max: 72    Blood Presuure Range:  Systolic (24hrs), Av , Min:116 , Max:160   ; Diastolic (24hrs), Av, Min:68, Max:85      Pulse ox Range: SpO2  Av.3 %  Min: 95 %  Max: 97 %    24hr I & O:    Intake/Output Summary (Last 24 hours) at 2025 8425  Last data filed at

## 2025-07-28 NOTE — PROGRESS NOTES
V2.0    [facility] Progress Note      Name:  Martín Soler /Age/Sex: 1945  (80 y.o. male)   MRN & CSN:  [id number] & [id number] Encounter Date/Time: 2025 7:04 AM EDT   Location:  -A PCP: Yogesh Alas MD     Attending:Arely Saleh*       Hospital Day: 2    Assessment and Recommendations   Martín Soler is a 80 y.o. male with pmh of recurrent syncopal episodes, HTN, and Vitamin D deficiency  who presents with Sinus pause      Plan:   Sinus pause  Hx of multiple syncopal episode over the past year  - Presenting complaint was a sinus pause 6 second; he was sent from cardiology clinic for pacemaker placement  - Cardio on board, EP consulted  - Planned for possible placement of pacemaker, NPO since MN  - Follow up echo and possible stress test     Essential HTN  - Continue lisinopril, adjust based on trends    Vitamin D deficiency  - Continue supplement      Diet Diet NPO   DVT Prophylaxis [x] Lovenox, []  Heparin, [] SCDs, [] Ambulation,  [] Eliquis, [] Xarelto  [] Coumadin   Code Status Full Code   Disposition From: home  Expected Disposition: home  Estimated Date of Discharge: 1-2 days  Patient requires continued admission due to pending cardiac and EP eval   Surrogate Decision Maker/ POA  Spouse     Personally reviewed Lab Studies and Imaging     Discussed management of the case with the IDR team and cardiology     Drugs that require monitoring for toxicity include lovenox and lisinopril and the method of monitoring was cbc and bmp        Subjective:     Chief Complaint: recurrent syncopal episodes and noted sinus pause on monitor    Martín Soler is a 80 y.o. male who is seen and examined at bedside. No acute complaints, pleasant and interactive. Spouse at bedside. Discussed POC and they are amenable.      Review of Systems:      Pertinent positives and negatives discussed in HPI    Objective:   No intake or output data in the 24 hours ending 25 0704   Vitals:   Vitals:

## 2025-07-28 NOTE — PLAN OF CARE
Problem: Discharge Planning  Goal: Discharge to home or other facility with appropriate resources  7/28/2025 0845 by Katerina Ramirez, RN  Outcome: Progressing     Problem: Pain  Goal: Verbalizes/displays adequate comfort level or baseline comfort level  7/28/2025 0845 by Katerina Ramirez, RN  Outcome: Progressing

## 2025-07-28 NOTE — PROGRESS NOTES
4 Eyes Skin Assessment     NAME:  Martín Soler  YOB: 1945  MEDICAL RECORD NUMBER:  [id number]    The patient is being assessed for  Admission    I agree that at least one RN has performed a thorough Head to Toe Skin Assessment on the patient. ALL assessment sites listed below have been assessed.      Areas assessed by both nurses:    Head, Face, Ears, Shoulders, Back, Chest, Arms, Elbows, Hands, Sacrum. Buttock, Coccyx, Ischium, Legs. Feet and Heels, and Under Medical Devices         Does the Patient have a Wound? No noted wound(s)       Mihai Prevention initiated by RN: No  Wound Care Orders initiated by RN: No    For hospital-acquired stage 1 & 2 and ALL Stage 3,4, Unstageable, DTI, NWPT, and Complex wounds: place order “IP Wound Care/Ostomy Nurse Eval and Treat” by RN under : NA    New Ostomies, if present place, Ostomy referral order under : No     Nurse 1 eSignature: Electronically signed by Eric Quiroz RN on 7/28/25 at 7:10 AM EDT    **SHARE this note so that the co-signing nurse can place an eSignature**    Nurse 2 eSignature: {Esignature:[id number]}

## 2025-07-28 NOTE — PROGRESS NOTES
Patient has met with Dr. Hunter and discussed procedure to include implantation of dual-chamber pacemaker.  Dr. Hunter discussed risks with patient.  Patient elects to proceed with implantation of dual-chamber pacemaker.  Consent signed and on chart.

## 2025-07-29 ENCOUNTER — TELEPHONE (OUTPATIENT)
Dept: CARDIOLOGY CLINIC | Age: 80
End: 2025-07-29

## 2025-07-29 VITALS
WEIGHT: 233 LBS | BODY MASS INDEX: 32.62 KG/M2 | DIASTOLIC BLOOD PRESSURE: 75 MMHG | SYSTOLIC BLOOD PRESSURE: 139 MMHG | OXYGEN SATURATION: 97 % | RESPIRATION RATE: 14 BRPM | HEIGHT: 71 IN | TEMPERATURE: 97.7 F | HEART RATE: 60 BPM

## 2025-07-29 LAB
ANION GAP SERPL CALCULATED.3IONS-SCNC: 12 MMOL/L (ref 9–17)
BUN SERPL-MCNC: 24 MG/DL (ref 7–20)
CALCIUM SERPL-MCNC: 8.7 MG/DL (ref 8.3–10.6)
CHLORIDE SERPL-SCNC: 104 MMOL/L (ref 99–110)
CO2 SERPL-SCNC: 21 MMOL/L (ref 21–32)
CREAT SERPL-MCNC: 1.1 MG/DL (ref 0.8–1.3)
ERYTHROCYTE [DISTWIDTH] IN BLOOD BY AUTOMATED COUNT: 13.4 % (ref 11.7–14.9)
GFR, ESTIMATED: 68 ML/MIN/1.73M2
GLUCOSE SERPL-MCNC: 140 MG/DL (ref 74–99)
HCT VFR BLD AUTO: 45.3 % (ref 42–52)
HGB BLD-MCNC: 15.2 G/DL (ref 13.5–18)
MAGNESIUM SERPL-MCNC: 2.1 MG/DL (ref 1.8–2.4)
MCH RBC QN AUTO: 32.5 PG (ref 27–31)
MCHC RBC AUTO-ENTMCNC: 33.6 G/DL (ref 32–36)
MCV RBC AUTO: 96.8 FL (ref 78–100)
PHOSPHATE SERPL-MCNC: 2.8 MG/DL (ref 2.5–4.9)
PLATELET # BLD AUTO: 147 K/UL (ref 140–440)
PMV BLD AUTO: 10.6 FL (ref 7.5–11.1)
POTASSIUM SERPL-SCNC: 4.1 MMOL/L (ref 3.5–5.1)
RBC # BLD AUTO: 4.68 M/UL (ref 4.6–6.2)
SODIUM SERPL-SCNC: 137 MMOL/L (ref 136–145)
WBC OTHER # BLD: 7.4 K/UL (ref 4–10.5)

## 2025-07-29 PROCEDURE — 6370000000 HC RX 637 (ALT 250 FOR IP): Performed by: INTERNAL MEDICINE

## 2025-07-29 PROCEDURE — 36415 COLL VENOUS BLD VENIPUNCTURE: CPT

## 2025-07-29 PROCEDURE — 2500000003 HC RX 250 WO HCPCS

## 2025-07-29 PROCEDURE — 84100 ASSAY OF PHOSPHORUS: CPT

## 2025-07-29 PROCEDURE — 83735 ASSAY OF MAGNESIUM: CPT

## 2025-07-29 PROCEDURE — 85027 COMPLETE CBC AUTOMATED: CPT

## 2025-07-29 PROCEDURE — 6360000002 HC RX W HCPCS

## 2025-07-29 PROCEDURE — 94761 N-INVAS EAR/PLS OXIMETRY MLT: CPT

## 2025-07-29 PROCEDURE — 80048 BASIC METABOLIC PNL TOTAL CA: CPT

## 2025-07-29 RX ADMIN — Medication 2000 UNITS: at 08:19

## 2025-07-29 RX ADMIN — ASPIRIN 81 MG: 81 TABLET, CHEWABLE ORAL at 08:19

## 2025-07-29 RX ADMIN — WATER 2000 MG: 1 INJECTION INTRAMUSCULAR; INTRAVENOUS; SUBCUTANEOUS at 02:40

## 2025-07-29 RX ADMIN — LISINOPRIL 10 MG: 5 TABLET ORAL at 08:19

## 2025-07-29 ASSESSMENT — PAIN SCALES - GENERAL
PAINLEVEL_OUTOF10: 0
PAINLEVEL_OUTOF10: 0

## 2025-07-29 NOTE — PLAN OF CARE
Problem: Discharge Planning  Goal: Discharge to home or other facility with appropriate resources  7/29/2025 0759 by Katerina Ramirez, RN  Outcome: Progressing     Problem: Pain  Goal: Verbalizes/displays adequate comfort level or baseline comfort level  7/29/2025 0759 by Katerina Ramirez, RN  Outcome: Progressing     Problem: Cardiovascular - Adult  Goal: Maintains optimal cardiac output and hemodynamic stability  7/29/2025 0759 by Katerina Ramirez, RN  Outcome: Progressing

## 2025-07-29 NOTE — DISCHARGE INSTRUCTIONS
Avoid raising the arm above shoulder for 4 weeks  No driving for 10 days  No lifting more than 10 lbs with the left hand  Follow up appointment with Doctor for wound check in 10 days  Notify Doctor if pain, fever, chills, swelling or bleeding in the surgical area  Please avoid sleeping on the surgical side.   Please do not allow anyone other than Dr Hunter's staff to remove or redress the surgical site. If the dressing were to fall off or fall partially off before the 10 day follow up appointment, please call the office ASAP.

## 2025-07-29 NOTE — PROGRESS NOTES
Patient left upper chest site no hematoma, no tenderness. Minimal bruise  CXR  - no acute abnormatities  Device interrogation - with in limits    Patient is stable for discharge from EP standpoint    Discharge instructions reviewed with patient and placed on AVS    1. Avoid raising the arm above shoulder for 4 weeks  2. No driving for 10 days  3. No lifting more than 10 lbs with the left hand  5. Follow up appointment with Doctor for wound check in 10 days  6. Notify Doctor if pain, fever, chills, swelling or bleeding in the surgical area  7. Please avoid sleeping on the surgical side.   8. Please do not allow anyone other than Dr Hunter's staff to remove or redress the surgical site. If the dressing were to fall off or fall partially off before the 10 day follow up appointment, please call the office ASAP.

## 2025-07-29 NOTE — PROGRESS NOTES
Discharge papers given to patient, Patient educated on limitations with left arm post pacemaker, all questions answered.

## 2025-07-29 NOTE — PLAN OF CARE
Problem: Discharge Planning  Goal: Discharge to home or other facility with appropriate resources  Outcome: Progressing     Problem: Pain  Goal: Verbalizes/displays adequate comfort level or baseline comfort level  Outcome: Progressing     Problem: Cardiovascular - Adult  Goal: Maintains optimal cardiac output and hemodynamic stability  Outcome: Progressing

## 2025-07-29 NOTE — TELEPHONE ENCOUNTER
Spoke with Patient and scheduled 1 month office visit follow up with Kate Sauer NP on 8/28/25 @ 930am.    Patient advised understanding.

## 2025-07-29 NOTE — DISCHARGE SUMMARY
V2.0  Discharge Summary    Name:  Martín Soler /Age/Sex: 1945 (80 y.o. male)   Admit Date: 2025  Discharge Date: 25    MRN & CSN:  [id number] & [id number] Encounter Date and Time 25 10:36 AM EDT    Attending:  Eleno Chauhan MD Discharging Provider: Eleno Chauhan MD       Hospital Course:     Brief HPI:     Martín Soler is a 80 y.o. male with pmh of hypertension, vitamin D deficiency, syncopal episodes, who was seen by Dr. Marsh's team in the clinic-was found to have sinus pause lasting 6 seconds. Cardiac monitor was placed due to history of multiple syncopal episode over the past year.  Patient was sent in to be admitted for permanent pacemaker placement     Brief Problem Based Course:     Sinus pause  Hx of multiple syncopal episode over the past year  - Presenting complaint was a sinus pause 6 second; he was sent from cardiology clinic for pacemaker placement  - Cardio on board, EP consulted  - Status post pacemaker placement 2025  - 2D echo completed EF 55 to 60%.  -Cleared by EP for discharge to follow-up as outpatient     Essential HTN  - Continue lisinopril     Vitamin D deficiency  - Continue supplement        The patient expressed appropriate understanding of, and agreement with the discharge recommendations, medications, and plan.     Consults this admission:  IP CONSULT TO ELECTROPHYSIOLOGY    Discharge Diagnosis:   Sinus pause        Discharge Instruction:   Follow up appointments:   Primary care physician: Yogesh Alas MD within 1 week  Diet: regular diet   Activity: activity as tolerated  Disposition: Discharged to:   [x]Home, [][facility], []SNF, []Acute Rehab, []Hospice   Condition on discharge: Stable  Labs and Tests to be Followed up as an outpatient by PCP or Specialist:     Discharge Medications:        Medication List        CONTINUE taking these medications      alfuzosin 10 MG extended release tablet  Commonly known as: UROXATRAL     aspirin 81 MG tablet

## 2025-07-30 ENCOUNTER — TELEPHONE (OUTPATIENT)
Dept: CARDIOLOGY CLINIC | Age: 80
End: 2025-07-30

## 2025-07-30 NOTE — TELEPHONE ENCOUNTER
Returned call to patient. He said that the bandage is not coming loose at the edges. It is only puckered in the middle somewhat. Patient said that it was swollen and he has been using ice for the swelling and it has went down. Patient said that he thinks the puckering is from this. I agreed with patient and advised to keep appt next week but if he had any other concerns before that time to call me back. He agreed.

## 2025-07-30 NOTE — TELEPHONE ENCOUNTER
Pt states he talked with LUCA Lu after pacemaker was put in on 7/28. Pt states he was instructed by Kate to call if bandage starts to come loose from the skin, but pt has noticed some puckering/wrinkling from the bandage. Pt would like call back please.

## 2025-08-07 ENCOUNTER — TELEPHONE (OUTPATIENT)
Dept: CARDIOLOGY CLINIC | Age: 80
End: 2025-08-07

## 2025-08-07 ENCOUNTER — CLINICAL SUPPORT (OUTPATIENT)
Dept: CARDIOLOGY CLINIC | Age: 80
End: 2025-08-07

## 2025-08-07 VITALS — TEMPERATURE: 97.3 F

## 2025-08-07 DIAGNOSIS — Z95.0 STATUS POST PLACEMENT OF CARDIAC PACEMAKER: Primary | ICD-10-CM

## 2025-08-07 PROCEDURE — 99024 POSTOP FOLLOW-UP VISIT: CPT | Performed by: INTERNAL MEDICINE

## 2025-08-11 ENCOUNTER — TELEPHONE (OUTPATIENT)
Dept: CARDIOLOGY CLINIC | Age: 80
End: 2025-08-11

## 2025-08-28 ENCOUNTER — OFFICE VISIT (OUTPATIENT)
Age: 80
End: 2025-08-28
Payer: MEDICARE

## 2025-08-28 VITALS
SYSTOLIC BLOOD PRESSURE: 136 MMHG | DIASTOLIC BLOOD PRESSURE: 76 MMHG | WEIGHT: 236.2 LBS | HEART RATE: 70 BPM | BODY MASS INDEX: 33.07 KG/M2 | HEIGHT: 71 IN

## 2025-08-28 DIAGNOSIS — I45.5 SINUS PAUSE: ICD-10-CM

## 2025-08-28 DIAGNOSIS — I10 PRIMARY HYPERTENSION: Primary | ICD-10-CM

## 2025-08-28 DIAGNOSIS — E78.2 MIXED HYPERLIPIDEMIA: ICD-10-CM

## 2025-08-28 DIAGNOSIS — I49.5 SICK SINUS SYNDROME (HCC): ICD-10-CM

## 2025-08-28 DIAGNOSIS — Z95.0 PACEMAKER: ICD-10-CM

## 2025-08-28 PROCEDURE — 3078F DIAST BP <80 MM HG: CPT

## 2025-08-28 PROCEDURE — 1159F MED LIST DOCD IN RCRD: CPT

## 2025-08-28 PROCEDURE — 3075F SYST BP GE 130 - 139MM HG: CPT

## 2025-08-28 PROCEDURE — 1123F ACP DISCUSS/DSCN MKR DOCD: CPT

## 2025-08-28 PROCEDURE — 1160F RVW MEDS BY RX/DR IN RCRD: CPT

## 2025-08-28 PROCEDURE — 99214 OFFICE O/P EST MOD 30 MIN: CPT

## 2025-08-28 ASSESSMENT — ENCOUNTER SYMPTOMS
BLOOD IN STOOL: 0
ABDOMINAL PAIN: 0
NAUSEA: 0
SHORTNESS OF BREATH: 0
CHEST TIGHTNESS: 0
TROUBLE SWALLOWING: 0

## (undated) DEVICE — SUTURE VICRYL SZ 2-0 L27IN ABSRB UD L26MM CT-2 1/2 CIR J269H

## (undated) DEVICE — Device

## (undated) DEVICE — SUTURE ABSORBABLE BRAIDED 2-0 CT-1 27 IN UD VICRYL J259H

## (undated) DEVICE — VALVED PEELABLE PTFE INTRODUCER: Brand: OPTISEAL™

## (undated) DEVICE — SUTURE ETHIBOND EXCEL SZ 0 L30IN NONABSORBABLE GRN CT1 L36MM X424H

## (undated) DEVICE — SUTURE VICRYL SZ 4-0 L18IN ABSRB UD L16MM PS-4 1/2 CIR PRIM J507G

## (undated) DEVICE — LIQUIBAND RAPID ADHESIVE 36/CS 0.8ML: Brand: MEDLINE